# Patient Record
Sex: FEMALE | Race: OTHER | URBAN - METROPOLITAN AREA
[De-identification: names, ages, dates, MRNs, and addresses within clinical notes are randomized per-mention and may not be internally consistent; named-entity substitution may affect disease eponyms.]

---

## 2024-04-25 LAB — MAMMOGRAPHY, EXTERNAL: NORMAL

## 2024-08-26 ENCOUNTER — TELEPHONE (OUTPATIENT)
Age: 39
End: 2024-08-26

## 2024-08-26 NOTE — TELEPHONE ENCOUNTER
----- Message from Dominguez DOE sent at 8/26/2024 10:54 AM EDT -----  Regarding: [facility] Appointment Request  [facility] Appointment Request    Patient needs appointment for [facility] Appointment Type: New to Provider.    Patient Requested Dates(s): As soon as possible / Preferred any day by next week.  Patient Requested Time: Anytime  Provider Name: Doesn't have a preferred doctor    Reason for Appointment Request: Established Patient - Available appointments did not meet patient need:    Patient is requesting to set up an appointment to be an establish patient by any of the providers here in the practice that's accepting a new patient. She doesn't have a preferred doctor. Whoever has the earliest availability would be good for her.  --------------------------------------------------------------------------------------------------------------------------    Relationship to Patient: Self     Call Back Information: OK to leave message on voicemail  Preferred Call Back Number: Phone [phone]

## 2024-08-26 NOTE — TELEPHONE ENCOUNTER
Spoke to pt and informed her unfortunately the next available new pt appt would be 10/24/24 with MARIO Alcaraz. Pt said that is too far out and that she needed something sooner.-TM 8/26/24